# Patient Record
Sex: FEMALE | Race: WHITE | NOT HISPANIC OR LATINO | Employment: FULL TIME | URBAN - NONMETROPOLITAN AREA
[De-identification: names, ages, dates, MRNs, and addresses within clinical notes are randomized per-mention and may not be internally consistent; named-entity substitution may affect disease eponyms.]

---

## 2020-11-02 ENCOUNTER — OFFICE VISIT CONVERTED (OUTPATIENT)
Dept: FAMILY MEDICINE CLINIC | Age: 30
End: 2020-11-02
Attending: NURSE PRACTITIONER

## 2020-11-23 ENCOUNTER — OFFICE VISIT CONVERTED (OUTPATIENT)
Dept: FAMILY MEDICINE CLINIC | Age: 30
End: 2020-11-23
Attending: NURSE PRACTITIONER

## 2021-01-19 ENCOUNTER — OFFICE VISIT CONVERTED (OUTPATIENT)
Dept: FAMILY MEDICINE CLINIC | Age: 31
End: 2021-01-19
Attending: NURSE PRACTITIONER
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
## 2021-05-18 NOTE — PROGRESS NOTES
Martha Kothari  1990     Office/Outpatient Visit    Visit Date: Tue, Jan 19, 2021 02:47 pm    Provider: Hayde Nair N.P. (Assistant: Rosana Cooley MA)    Location: [facility]        Electronically signed by Hayde Nair N.P. on  01/24/2021 04:09:23 PM                             Subjective:        CC: Ms. Kothari is a 30 year old White female.  presents today due to refills         HPI:           Patient to be evaluated for dysthymic disorder.  Her anxiety disorder was originally diagnosed 6 months ago.  True panic attacks occur in addition to generalized anxiety.  reports that she ran out of the medication about 1 week ago and can tell .  She needs to get refills. She feels like the current dose is working ok           Complaint of panic disorder [episodic paroxysmal anxiety]..  Joy reports that her panic attacks have decreased.  She , however, has not used her medication  - she actually did not get it filled.  She thinks she is doing ok on the sertraline     ROS:     CONSTITUTIONAL:  Negative for chills, fatigue and fever.      CARDIOVASCULAR:  Negative for chest pain and pedal edema.      RESPIRATORY:  Negative for recent cough and dyspnea.      PSYCHIATRIC:  Positive for anxiety, depression, feelings of stress, mood swings and difficulty concentrating.   Negative for suicidal thoughts.          Past Medical History / Family History / Social History:         Past Medical History:             PAST MEDICAL HISTORY     UNREMARKABLE         Surgical History:     NONE         Family History:     Unremarkable         Social History:     Occupation: [employer] work     Marital Status: Single     Children: 1 child         Tobacco/Alcohol/Supplements:     Last Reviewed on 1/19/2021 02:51 PM by Rosana Cooley    Tobacco: Current Smoker: She currently smokes every day, 1 pack per day.          Alcohol: Frequency: Socially     Caffeine:  She admits to consuming caffeine via  coffee ( 1 serving per day ) and -Monster.          Substance Abuse History:     None         Mental Health History:         Generalized Anxiety Disorder ( diagnosed in years )         Communicable Diseases (eg STDs):     Reportable health conditions; NEGATIVE         Current Problems:     Dysthymic disorder    Panic disorder [episodic paroxysmal anxiety]        Immunizations:     None        Allergies:     Last Reviewed on 1/19/2021 02:50 PM by Rosana Cooley    No Known Allergies.        Current Medications:     Last Reviewed on 1/19/2021 02:50 PM by Rosana Cooley    No Known Medications.    sertraline 50 mg oral tablet [take   1 tablet (50 mg) by oral route once daily]        Objective:        Vitals:         Current: 1/19/2021 2:52:47 PM    Ht:  5 ft, 4 in;  Wt: 162.4 lbs;  BMI: 27.9T: 97.9 F (temporal);  BP: 120/76 mm Hg (right arm, sitting);  P: 70 bpm (right arm (BP Cuff), sitting)        Exams:     PHYSICAL EXAM:     GENERAL: vital signs recorded - well developed, well nourished;  no apparent distress;     RESPIRATORY: normal appearance and symmetric expansion of chest wall; normal respiratory rate and pattern with no distress; normal breath sounds with no rales, rhonchi, wheezes or rubs;     CARDIOVASCULAR: normal rate; rhythm is regular;  no edema;     MUSCULOSKELETAL: normal gait; normal range of motion of all major muscle groups; no limb or joint pain with range of motion;     NEUROLOGIC: mental status: alert and oriented x 3;     PSYCHIATRIC: appropriate affect and demeanor; normal speech pattern; normal thought and perception;         Assessment:         F34.1   Dysthymic disorder       F41.0   Panic disorder [episodic paroxysmal anxiety]           ORDERS:         Meds Prescribed:       [Refilled] sertraline 50 mg oral tablet [take   1 tablet (50 mg) by oral route once daily], #30 (thirty) tablets, Refills: 3 (three)                 Plan:         Dysthymic disorderResume medication as  previously prescribed.  She will follow up in 3 months.  If continues to be stable, will have her follow up every 6 months          Prescriptions:       [Refilled] sertraline 50 mg oral tablet [take   1 tablet (50 mg) by oral route once daily], #30 (thirty) tablets, Refills: 3 (three)         Panic disorder [episodic paroxysmal anxiety]She has not been using the hydroxyzine  and will fill if she needs before her next appt in 3 months            Charge Capture:         Primary Diagnosis:     F34.1  Dysthymic disorder           Orders:      [id number]  Office/outpatient visit; established patient, level 3  (In-House)              F41.0  Panic disorder [episodic paroxysmal anxiety]         ADDENDUMS:      ____________________________________    Addendum: 01/26/2021 01:02 PM - Hayde Nair   [id number]        Add   [id number]

## 2021-05-18 NOTE — PROGRESS NOTES
Martha Kothari  1990     Office/Outpatient Visit    Visit Date: Mon, Nov 23, 2020 02:36 pm    Provider: Hayde Nair N.P. (Assistant: Beatris Cuadra MA)    Location: [facility]        Electronically signed by Hayde Nair N.P. on  11/23/2020 05:35:03 PM                             Subjective:        CC: Ms. Kothari is a 30 year old White female.  This is a follow-up visit.  on meds, pt says she did not have hydroxyzine filled;         HPI:           Patient presents with dysthymic disorder.  This is a routine follow-up.  Current medications include Zoloft.  Current affective symptoms include anxious mood and feels like her moods are much more stable, seems to not be as overwhelmed with her emotions.  She does continue to have problems falling asleep, but does not think she needs any additional medication at this time.  She denies altered sleep habits.  Presently, Ms. Kothari denies suicidal ideation.      ROS:     CONSTITUTIONAL:  Negative for chills, fatigue and fever.      CARDIOVASCULAR:  Negative for chest pain and pedal edema.      RESPIRATORY:  Negative for recent cough and dyspnea.      GASTROINTESTINAL:  Negative for abdominal pain, constipation, diarrhea, heartburn, nausea and vomiting.      GENITOURINARY:  Negative for dysuria and change in urine stream.      PSYCHIATRIC:  Positive for anxiety, depression, feelings of stress ( (but improved) ) and sleep disturbance.   Negative for suicidal thoughts.          Past Medical History / Family History / Social History:         Past Medical History:             PAST MEDICAL HISTORY     UNREMARKABLE         Surgical History:     NONE         Family History:     Unremarkable         Social History:     Occupation: [employer] work     Marital Status: Single     Children: 1 child         Tobacco/Alcohol/Supplements:     Last Reviewed on 11/02/2020 02:31 PM by Anna Zurita    Tobacco: Current Smoker: She currently smokes every  day, 1 pack per day.          Alcohol: Frequency: Socially     Caffeine:  She admits to consuming caffeine via coffee ( 1 serving per day ) and -Monster.          Substance Abuse History:     None         Mental Health History:         Generalized Anxiety Disorder ( diagnosed in years )         Communicable Diseases (eg STDs):     Reportable health conditions; NEGATIVE         Current Problems:     Dysthymic disorder    Panic disorder [episodic paroxysmal anxiety]        Immunizations:     None        Allergies:     Last Reviewed on 11/02/2020 02:31 PM by Anna Zurita    No Known Allergies.        Current Medications:     Last Reviewed on 11/02/2020 02:31 PM by Anna Zurita    No Known Medications.    sertraline 50 mg oral tablet [take 1/2 tab x 3 days then  1 tablet (50 mg) by oral route once daily]    hydrOXYzine HCL 50 mg oral tablet [take 1/2 - 1 tablet (50 mg) by oral route BID PRN panic attacks]        Objective:        Vitals:         Current: 11/23/2020 2:43:12 PM    Ht:  5 ft, 4 in;  Wt: 158.8 lbs;  BMI: 27.3T: 97.9 F (temporal);  BP: 126/72 mm Hg (left arm, sitting);  P: 72 bpm (left arm (BP Cuff), sitting)        Exams:     PHYSICAL EXAM:     GENERAL: vital signs recorded - well developed, well nourished;  no apparent distress;     RESPIRATORY: normal appearance and symmetric expansion of chest wall; normal respiratory rate and pattern with no distress; normal breath sounds with no rales, rhonchi, wheezes or rubs;     CARDIOVASCULAR: normal rate; rhythm is regular;  no edema;     MUSCULOSKELETAL: normal gait; normal range of motion of all major muscle groups; no limb or joint pain with range of motion;     NEUROLOGIC: mental status: alert and oriented x 3;     PSYCHIATRIC: appropriate affect and demeanor; normal speech pattern; normal thought and perception;         Assessment:         F34.1   Dysthymic disorder           ORDERS:         Meds Prescribed:       [Refilled] sertraline 50 mg oral tablet  [take   1 tablet (50 mg) by oral route once daily], #30 (thirty) tablets, Refills: 2 (two)                 Plan:         Dysthymic disorderWill keep her hon medication for now at the current dose and will have her fllow up in 3 months   sooner if problems or worsening symtpoms          Prescriptions:       [Refilled] sertraline 50 mg oral tablet [take   1 tablet (50 mg) by oral route once daily], #30 (thirty) tablets, Refills: 2 (two)             Charge Capture:         Primary Diagnosis:     F34.1  Dysthymic disorder           Orders:      [id number]  Office/outpatient visit; established patient, level 3  (In-House)

## 2021-06-14 ENCOUNTER — OFFICE VISIT (OUTPATIENT)
Dept: FAMILY MEDICINE CLINIC | Age: 31
End: 2021-06-14

## 2021-06-14 ENCOUNTER — LAB (OUTPATIENT)
Dept: LAB | Facility: HOSPITAL | Age: 31
End: 2021-06-14

## 2021-06-14 VITALS
TEMPERATURE: 98.9 F | HEART RATE: 70 BPM | WEIGHT: 154.8 LBS | HEIGHT: 64 IN | SYSTOLIC BLOOD PRESSURE: 130 MMHG | BODY MASS INDEX: 26.43 KG/M2 | DIASTOLIC BLOOD PRESSURE: 75 MMHG

## 2021-06-14 DIAGNOSIS — N94.6 DYSMENORRHEA: ICD-10-CM

## 2021-06-14 DIAGNOSIS — R53.83 FATIGUE, UNSPECIFIED TYPE: ICD-10-CM

## 2021-06-14 DIAGNOSIS — F34.1 DYSTHYMIC DISORDER: Primary | ICD-10-CM

## 2021-06-14 LAB
ALBUMIN SERPL-MCNC: 4.3 G/DL (ref 3.5–5.2)
ALBUMIN/GLOB SERPL: 2.5 G/DL
ALP SERPL-CCNC: 54 U/L (ref 39–117)
ALT SERPL W P-5'-P-CCNC: 16 U/L (ref 1–33)
ANION GAP SERPL CALCULATED.3IONS-SCNC: 10.1 MMOL/L (ref 5–15)
AST SERPL-CCNC: 15 U/L (ref 1–32)
BACTERIA UR QL AUTO: ABNORMAL /HPF
BASOPHILS # BLD AUTO: 0.03 10*3/MM3 (ref 0–0.2)
BASOPHILS NFR BLD AUTO: 0.5 % (ref 0–1.5)
BILIRUB SERPL-MCNC: 0.2 MG/DL (ref 0–1.2)
BILIRUB UR QL STRIP: NEGATIVE
BUN SERPL-MCNC: 13 MG/DL (ref 6–20)
BUN/CREAT SERPL: 20.6 (ref 7–25)
CALCIUM SPEC-SCNC: 8.9 MG/DL (ref 8.6–10.5)
CHLORIDE SERPL-SCNC: 105 MMOL/L (ref 98–107)
CLARITY UR: ABNORMAL
CO2 SERPL-SCNC: 26.9 MMOL/L (ref 22–29)
COLOR UR: YELLOW
CREAT SERPL-MCNC: 0.63 MG/DL (ref 0.57–1)
DEPRECATED RDW RBC AUTO: 41.8 FL (ref 37–54)
EOSINOPHIL # BLD AUTO: 0.05 10*3/MM3 (ref 0–0.4)
EOSINOPHIL NFR BLD AUTO: 0.9 % (ref 0.3–6.2)
ERYTHROCYTE [DISTWIDTH] IN BLOOD BY AUTOMATED COUNT: 12.3 % (ref 12.3–15.4)
GFR SERPL CREATININE-BSD FRML MDRD: 110 ML/MIN/1.73
GLOBULIN UR ELPH-MCNC: 1.7 GM/DL
GLUCOSE SERPL-MCNC: 89 MG/DL (ref 65–99)
GLUCOSE UR STRIP-MCNC: NEGATIVE MG/DL
HCT VFR BLD AUTO: 42.6 % (ref 34–46.6)
HGB BLD-MCNC: 13.9 G/DL (ref 12–15.9)
HGB UR QL STRIP.AUTO: ABNORMAL
IMM GRANULOCYTES # BLD AUTO: 0 10*3/MM3 (ref 0–0.05)
IMM GRANULOCYTES NFR BLD AUTO: 0 % (ref 0–0.5)
IRON 24H UR-MRATE: 47 MCG/DL (ref 37–145)
IRON SATN MFR SERPL: 14 % (ref 20–50)
KETONES UR QL STRIP: NEGATIVE
LEUKOCYTE ESTERASE UR QL STRIP.AUTO: NEGATIVE
LYMPHOCYTES # BLD AUTO: 2.07 10*3/MM3 (ref 0.7–3.1)
LYMPHOCYTES NFR BLD AUTO: 37.6 % (ref 19.6–45.3)
MCH RBC QN AUTO: 30 PG (ref 26.6–33)
MCHC RBC AUTO-ENTMCNC: 32.6 G/DL (ref 31.5–35.7)
MCV RBC AUTO: 91.8 FL (ref 79–97)
MONOCYTES # BLD AUTO: 0.25 10*3/MM3 (ref 0.1–0.9)
MONOCYTES NFR BLD AUTO: 4.5 % (ref 5–12)
NEUTROPHILS NFR BLD AUTO: 3.1 10*3/MM3 (ref 1.7–7)
NEUTROPHILS NFR BLD AUTO: 56.5 % (ref 42.7–76)
NITRITE UR QL STRIP: NEGATIVE
OTHER OBSERVATIONS IN URINE MICRO: ABNORMAL /HPF
PH UR STRIP.AUTO: 8 [PH] (ref 5–8)
PLATELET # BLD AUTO: 244 10*3/MM3 (ref 140–450)
PMV BLD AUTO: 10.6 FL (ref 6–12)
POTASSIUM SERPL-SCNC: 4.1 MMOL/L (ref 3.5–5.2)
PROT SERPL-MCNC: 6 G/DL (ref 6–8.5)
PROT UR QL STRIP: NEGATIVE
RBC # BLD AUTO: 4.64 10*6/MM3 (ref 3.77–5.28)
RBC # UR: ABNORMAL /HPF
REF LAB TEST METHOD: ABNORMAL
SODIUM SERPL-SCNC: 142 MMOL/L (ref 136–145)
SP GR UR STRIP: 1.02 (ref 1–1.03)
SQUAMOUS #/AREA URNS HPF: ABNORMAL /HPF
T4 FREE SERPL-MCNC: 1.01 NG/DL (ref 0.93–1.7)
TIBC SERPL-MCNC: 325 MCG/DL (ref 298–536)
TRANSFERRIN SERPL-MCNC: 218 MG/DL (ref 200–360)
TSH SERPL DL<=0.05 MIU/L-ACNC: 1.11 UIU/ML (ref 0.27–4.2)
UROBILINOGEN UR QL STRIP: ABNORMAL
VIT B12 BLD-MCNC: 1108 PG/ML (ref 211–946)
WBC # BLD AUTO: 5.5 10*3/MM3 (ref 3.4–10.8)
WBC UR QL AUTO: ABNORMAL /HPF

## 2021-06-14 PROCEDURE — 84439 ASSAY OF FREE THYROXINE: CPT

## 2021-06-14 PROCEDURE — 82607 VITAMIN B-12: CPT

## 2021-06-14 PROCEDURE — 84466 ASSAY OF TRANSFERRIN: CPT

## 2021-06-14 PROCEDURE — 83540 ASSAY OF IRON: CPT

## 2021-06-14 PROCEDURE — 36415 COLL VENOUS BLD VENIPUNCTURE: CPT

## 2021-06-14 PROCEDURE — 84443 ASSAY THYROID STIM HORMONE: CPT

## 2021-06-14 PROCEDURE — 81001 URINALYSIS AUTO W/SCOPE: CPT

## 2021-06-14 PROCEDURE — 99214 OFFICE O/P EST MOD 30 MIN: CPT | Performed by: NURSE PRACTITIONER

## 2021-06-14 PROCEDURE — 85025 COMPLETE CBC W/AUTO DIFF WBC: CPT

## 2021-06-14 PROCEDURE — 80053 COMPREHEN METABOLIC PANEL: CPT

## 2021-06-14 NOTE — PROGRESS NOTES
"Chief Complaint  Follow-up    Subjective          Martha Kothari presents to [facility]     Re: Dysthymic disorder Ms. Kothari reports today that she is doing well on sertraline however she is having intermittent flareups which she calls loss of control.  She reports this happens about every 2 to 3 months it is not regular.  She was previously referred to mental health counseling at which time she thought the cost was too much so she declined to make an appointment.  She wants to continue on the sertraline.  Denies suicidal ideation.  She does feel like these episodes are triggered by \"trauma\".  Anything that is out of her normal day-to-day she considers trauma.    She is also concerned about fatigue.  She reports that she cannot ever get enough sleep.  She is requesting labs today.  LMP 3 weeks ago    Review of Systems   Constitutional: Positive for fatigue. Negative for appetite change.   Respiratory: Negative for cough and chest tightness.    Cardiovascular: Positive for palpitations (occasional with panic attack). Negative for chest pain.   Endocrine: Positive for polyuria.   Genitourinary: Positive for menstrual problem (very heavy periods  - goest through super in 1 hour at times ).   Allergic/Immunologic: Positive for environmental allergies.   Neurological: Positive for dizziness (when going from sitting to standing).   Psychiatric/Behavioral: Positive for agitation, decreased concentration and sleep disturbance (insomnia/ hypersomnia).        Health Maintenance Due   Topic Date Due   • ANNUAL PHYSICAL  Never done   • Pneumococcal Vaccine 0-64 (1 of 1 - PPSV23) Never done   • COVID-19 Vaccine (1) Never done   • HEPATITIS C SCREENING  Never done   • PAP SMEAR  Never done          Objective   Vital Signs:   /75 (BP Location: Left arm, Patient Position: Sitting)   Pulse 70   Temp 98.9 °F (37.2 °C) (Oral)   Ht 162.6 cm (64\")   Wt 70.2 kg (154 lb 12.8 oz)   BMI 26.57 " kg/m²     Physical Exam  Vitals reviewed.   Constitutional:       Appearance: Normal appearance. She is well-developed. She is not ill-appearing.   HENT:      Head: Normocephalic.      Right Ear: Tympanic membrane, ear canal and external ear normal.      Left Ear: Tympanic membrane, ear canal and external ear normal.      Mouth/Throat:      Lips: Pink.      Mouth: Mucous membranes are moist.      Pharynx: Oropharynx is clear. Uvula midline. No oropharyngeal exudate.   Eyes:      Extraocular Movements: Extraocular movements intact.      Conjunctiva/sclera: Conjunctivae normal.      Pupils: Pupils are equal, round, and reactive to light.   Neck:      Thyroid: No thyromegaly.   Cardiovascular:      Rate and Rhythm: Normal rate and regular rhythm.      Heart sounds: No murmur heard.   No friction rub. No gallop.    Pulmonary:      Effort: Pulmonary effort is normal.      Breath sounds: Normal breath sounds. No wheezing or rhonchi.   Abdominal:      General: Bowel sounds are normal. There is no distension.      Palpations: Abdomen is soft.      Tenderness: There is no abdominal tenderness.   Musculoskeletal:      Cervical back: Normal range of motion.   Lymphadenopathy:      Head:      Right side of head: No submandibular adenopathy.      Left side of head: No submandibular adenopathy.      Cervical: No cervical adenopathy.   Skin:     General: Skin is warm and dry.      Findings: No lesion (abnormal moles) or rash.   Neurological:      Mental Status: She is alert and oriented to person, place, and time.      Cranial Nerves: No cranial nerve deficit.      Gait: Gait is intact.   Psychiatric:         Mood and Affect: Affect normal. Mood is anxious.         Behavior: Behavior normal.         Thought Content: Thought content normal.         Judgment: Judgment normal.          Result Review :              Assessment and Plan    Diagnoses and all orders for this visit:    1. Dysthymic disorder (Primary)  Comments:  Will have  her set up appt with [facility]  - discuss with them payment options. consider medication management with Psych  Assessment & Plan:  Patient's depression is recurrent and is moderate without psychosis. Their depression is currently active and the condition is worsening. This will be reassessed in 3 months. F/U as described:patient will continue current medication therapy and patient referred to Mental Health Specialist.    Orders:  -     Cancel: Ambulatory Referral to Psychiatry  -     sertraline (ZOLOFT) 50 MG tablet; Take 1 tablet by mouth Daily for 30 doses.  Dispense: 30 tablet; Refill: 3  -     Ambulatory Referral to Psychiatry    2. Fatigue, unspecified type  Comments:  will get labs today   - may need sleep study in future  - but plan to get mental health needs addressed prior to moving forward   Orders:  -     CBC w AUTO Differential; Future  -     Comprehensive metabolic panel; Future  -     TSH+Free T4; Future  -     Vitamin B12; Future  -     Iron and TIBC; Future  -     Urinalysis With Microscopic - Urine, Clean Catch; Future    3. Dysmenorrhea  Comments:  will check iron, consider OC   Orders:  -     Iron and TIBC; Future  -     Urinalysis With Microscopic - Urine, Clean Catch; Future      Follow Up    No follow-ups on file.  Patient was given instructions and counseling regarding her condition or for health maintenance advice. Please see specific information pulled into the AVS if appropriate.

## 2021-06-14 NOTE — ASSESSMENT & PLAN NOTE
Patient's depression is recurrent and is moderate without psychosis. Their depression is currently active and the condition is worsening. This will be reassessed in 3 months. F/U as described:patient will continue current medication therapy and patient referred to Mental Health Specialist.

## 2021-07-02 VITALS
HEIGHT: 64 IN | TEMPERATURE: 97.9 F | DIASTOLIC BLOOD PRESSURE: 76 MMHG | WEIGHT: 162.4 LBS | SYSTOLIC BLOOD PRESSURE: 120 MMHG | BODY MASS INDEX: 27.72 KG/M2 | HEART RATE: 70 BPM

## 2021-07-02 VITALS
WEIGHT: 158.8 LBS | SYSTOLIC BLOOD PRESSURE: 126 MMHG | HEART RATE: 72 BPM | BODY MASS INDEX: 27.11 KG/M2 | TEMPERATURE: 97.9 F | HEIGHT: 64 IN | DIASTOLIC BLOOD PRESSURE: 72 MMHG

## 2021-07-02 VITALS
BODY MASS INDEX: 27.86 KG/M2 | WEIGHT: 163.2 LBS | HEART RATE: 70 BPM | TEMPERATURE: 97.7 F | SYSTOLIC BLOOD PRESSURE: 122 MMHG | HEIGHT: 64 IN | DIASTOLIC BLOOD PRESSURE: 84 MMHG

## 2021-08-17 NOTE — PROGRESS NOTES
"Chief Complaint  Arm Pain (right, painful and swollen, started Friday)    Subjective          Martha Kothari presents to [facility]  Right upper arm pain started Friday.  Her arm was warm to touch on Saturday and Sunday, and was \"reddish-blue\" in color, but it's not warm or discolored any more.  It was not tender to touch, but has been painful for her to move her arm.  She went to [facility] on August 16, and a venous Doppler of her right arm did not reveal any thrombosis.  Her x-ray of her right shoulder revealed mild soft tissue swelling around the shoulder without acute osseous abnormality.  She does not remember any injury to her right shoulder.  Has been very hard for her to put on her bra and brush her teeth due to the pain.  She has not started her naproxen yet, but she has been taking ibuprofen.  She has also been icing her shoulder.    Arm Pain   The incident occurred 5 to 7 days ago. There was no injury mechanism. The pain is present in the right shoulder, upper right arm and right elbow. The quality of the pain is described as aching and stabbing. The pain does not radiate. The pain is at a severity of 8/10. The pain is severe. The pain has been constant since the incident. Associated symptoms include muscle weakness. Pertinent negatives include no numbness or tingling. The symptoms are aggravated by lifting and movement. She has tried ice, NSAIDs and elevation for the symptoms. The treatment provided no relief.       Objective   Vital Signs:   /80 (BP Location: Left arm, Patient Position: Sitting)   Pulse 72   Ht 162.6 cm (64\")   Wt 73.2 kg (161 lb 6.4 oz)   BMI 27.70 kg/m²     Physical Exam  Constitutional:       General: She is not in acute distress.     Appearance: Normal appearance. She is normal weight.   HENT:      Head: Normocephalic.   Eyes:      Pupils: Pupils are equal, round, and reactive to light.      Visual Fields: Right eye visual fields normal and " left eye visual fields normal.   Neck:      Trachea: Trachea normal.   Cardiovascular:      Rate and Rhythm: Normal rate and regular rhythm.      Heart sounds: Normal heart sounds.   Pulmonary:      Effort: Pulmonary effort is normal.      Breath sounds: Normal breath sounds and air entry.   Musculoskeletal:         General: Swelling (Right shoulder and upper arm) present.      Right lower leg: No edema.      Left lower leg: No edema.   Skin:     General: Skin is warm and dry.   Neurological:      Mental Status: She is alert and oriented to person, place, and time.   Psychiatric:         Mood and Affect: Mood normal. Affect is tearful.         Behavior: Behavior normal.         Thought Content: Thought content normal.        Result Review :   The following data was reviewed by: ADAM Jacques on 08/18/2021:  RADIOLOGY - SCAN - DOPPLER/FLAGET/[id number] (08/16/2021)  RADIOLOGY - SCAN - RSHOULDER/FLAGET/[id number] (08/16/2021)               Assessment and Plan    Diagnoses and all orders for this visit:    1. Pain and swelling of right shoulder (Primary)  -     Ambulatory Referral to Physical Therapy Evaluate and treat  -     MRI Shoulder Right Without Contrast; Future    2. Pain of right upper arm  -     Ambulatory Referral to Physical Therapy Evaluate and treat  -     MRI Shoulder Right Without Contrast; Future    Her right shoulder and right upper arm are considerably more swollen than her left shoulder and upper arm.  There was no pain to palpation and it was not warm to touch.  Will try to obtain an MRI of her shoulder.  Doppler of her right arm did not reveal any thrombosis.  We have discussed the use of naproxen and NSAIDs.  I have also recommended that she continue to ice it.  Will refer to physical therapy as well.    Follow Up   Return if symptoms worsen or fail to improve, for Pending test results.  Patient was given instructions and counseling regarding her condition or for health maintenance advice.  Please see specific information pulled into the AVS if appropriate.

## 2021-08-18 ENCOUNTER — OFFICE VISIT (OUTPATIENT)
Dept: FAMILY MEDICINE CLINIC | Age: 31
End: 2021-08-18

## 2021-08-18 VITALS
BODY MASS INDEX: 27.55 KG/M2 | SYSTOLIC BLOOD PRESSURE: 124 MMHG | WEIGHT: 161.4 LBS | HEART RATE: 72 BPM | HEIGHT: 64 IN | DIASTOLIC BLOOD PRESSURE: 80 MMHG

## 2021-08-18 DIAGNOSIS — M25.511 PAIN AND SWELLING OF RIGHT SHOULDER: Primary | ICD-10-CM

## 2021-08-18 DIAGNOSIS — M25.411 PAIN AND SWELLING OF RIGHT SHOULDER: Primary | ICD-10-CM

## 2021-08-18 DIAGNOSIS — M79.621 PAIN OF RIGHT UPPER ARM: ICD-10-CM

## 2021-08-18 PROCEDURE — 99213 OFFICE O/P EST LOW 20 MIN: CPT | Performed by: NURSE PRACTITIONER

## 2021-08-18 RX ORDER — NAPROXEN 500 MG/1
500 TABLET ORAL AS NEEDED
COMMUNITY
Start: 2021-08-17 | End: 2021-10-12

## 2021-08-23 NOTE — PROGRESS NOTES
"Chief Complaint  Hospital Follow Up Visit ([facility], 8/19-21/21, rhabdo)    Subjective          Martha Kothari presents to [facility]  She was admitted to [facility] on 08/19 for rhabdomyolysis.  She was discharged on 08/2021.  She was given \"a lot\" of IV fluids, and was discharged.  No history of statin use.  She was lifting weights, but nothing extreme.  She is taking Zoloft.  She is wanting to come off Zoloft, as she feels that she doesn't need it anymore.  She has not taken the medication in over a week and a half.  She has not had any symptoms in regards to stopping her Zoloft.  She feels that her anxiety and depression have significantly improved.  Denies SI/HI.  Her shoulder is , but the swelling in her arm has improved.  She reports that her urine is not dark.  She still has some tenderness, but most of the pain has resolved.  Most of the swelling is gone.       Objective   Vital Signs:   /86 (BP Location: Left arm, Patient Position: Sitting)   Pulse 57   Ht 162.6 cm (64\")   Wt 82.2 kg (181 lb 3.2 oz)   BMI 31.10 kg/m²     Physical Exam  Constitutional:       General: She is not in acute distress.     Appearance: Normal appearance. She is normal weight.   HENT:      Head: Normocephalic.   Eyes:      Pupils: Pupils are equal, round, and reactive to light.      Visual Fields: Right eye visual fields normal and left eye visual fields normal.   Neck:      Trachea: Trachea normal.   Cardiovascular:      Rate and Rhythm: Normal rate and regular rhythm.      Heart sounds: Normal heart sounds.   Pulmonary:      Effort: Pulmonary effort is normal.      Breath sounds: Normal breath sounds and air entry.   Musculoskeletal:         General: Swelling (Minor Right shoulder) and tenderness (slight in right shoulder) present.      Right lower leg: No edema.      Left lower leg: No edema.   Skin:     General: Skin is warm and dry.   Neurological:      " Mental Status: She is alert and oriented to person, place, and time.   Psychiatric:         Mood and Affect: Mood and affect normal.         Behavior: Behavior normal.         Thought Content: Thought content normal.        Result Review :   The following data was reviewed by: ADAM Jacques on 08/24/2021:  CBC AND DIFFERENTIAL (08/21/2021 03:08)  CK (08/21/2021 03:08)  COMPREHENSIVE METABOLIC PANEL (08/19/2021 00:17)  Venous doppler from 08/19/2021             Assessment and Plan    Diagnoses and all orders for this visit:    1. Non-traumatic rhabdomyolysis (Primary)  -     Ambulatory Referral to Physical Therapy Evaluate and treat  -     Comprehensive Metabolic Panel; Future  -     CK; Future    2. Acute pain of right shoulder    She was recently hospitalized for rhabdomyolysis.  Her swelling and pain have greatly improved.  Will refer her to PT.  Zoloft may have been the source of her rhabdomyolysis.  Will still pursue MRI of shoulder due to abnormal venous doppler at [facility].    Follow Up   Return for Pending test results.  Patient was given instructions and counseling regarding her condition or for health maintenance advice. Please see specific information pulled into the AVS if appropriate.

## 2021-08-24 ENCOUNTER — OFFICE VISIT (OUTPATIENT)
Dept: FAMILY MEDICINE CLINIC | Age: 31
End: 2021-08-24

## 2021-08-24 ENCOUNTER — LAB (OUTPATIENT)
Dept: LAB | Facility: HOSPITAL | Age: 31
End: 2021-08-24

## 2021-08-24 VITALS
DIASTOLIC BLOOD PRESSURE: 86 MMHG | HEIGHT: 64 IN | HEART RATE: 57 BPM | WEIGHT: 181.2 LBS | SYSTOLIC BLOOD PRESSURE: 139 MMHG | BODY MASS INDEX: 30.93 KG/M2

## 2021-08-24 DIAGNOSIS — M62.82 NON-TRAUMATIC RHABDOMYOLYSIS: ICD-10-CM

## 2021-08-24 DIAGNOSIS — M25.511 ACUTE PAIN OF RIGHT SHOULDER: ICD-10-CM

## 2021-08-24 DIAGNOSIS — M62.82 NON-TRAUMATIC RHABDOMYOLYSIS: Primary | ICD-10-CM

## 2021-08-24 LAB
ALBUMIN SERPL-MCNC: 3.7 G/DL (ref 3.5–5.2)
ALBUMIN/GLOB SERPL: 1.9 G/DL
ALP SERPL-CCNC: 51 U/L (ref 39–117)
ALT SERPL W P-5'-P-CCNC: 41 U/L (ref 1–33)
ANION GAP SERPL CALCULATED.3IONS-SCNC: 10.2 MMOL/L (ref 5–15)
AST SERPL-CCNC: 14 U/L (ref 1–32)
BILIRUB SERPL-MCNC: 0.2 MG/DL (ref 0–1.2)
BUN SERPL-MCNC: 13 MG/DL (ref 6–20)
BUN/CREAT SERPL: 10.3 (ref 7–25)
CALCIUM SPEC-SCNC: 8.5 MG/DL (ref 8.6–10.5)
CHLORIDE SERPL-SCNC: 109 MMOL/L (ref 98–107)
CK SERPL-CCNC: 254 U/L (ref 20–180)
CO2 SERPL-SCNC: 26.8 MMOL/L (ref 22–29)
CREAT SERPL-MCNC: 1.26 MG/DL (ref 0.57–1)
GFR SERPL CREATININE-BSD FRML MDRD: 50 ML/MIN/1.73
GLOBULIN UR ELPH-MCNC: 1.9 GM/DL
GLUCOSE SERPL-MCNC: 84 MG/DL (ref 65–99)
POTASSIUM SERPL-SCNC: 4.4 MMOL/L (ref 3.5–5.2)
PROT SERPL-MCNC: 5.6 G/DL (ref 6–8.5)
SODIUM SERPL-SCNC: 146 MMOL/L (ref 136–145)

## 2021-08-24 PROCEDURE — 99214 OFFICE O/P EST MOD 30 MIN: CPT | Performed by: NURSE PRACTITIONER

## 2021-08-24 PROCEDURE — 80053 COMPREHEN METABOLIC PANEL: CPT

## 2021-08-24 PROCEDURE — 36415 COLL VENOUS BLD VENIPUNCTURE: CPT

## 2021-08-24 PROCEDURE — 82550 ASSAY OF CK (CPK): CPT

## 2021-08-24 NOTE — PATIENT INSTRUCTIONS
Rhabdomyolysis  Rhabdomyolysis is a condition that happens when muscle cells break down and release substances into the blood that can damage the kidneys. This condition happens because of damage to the muscles that move bones (skeletal muscles). When the skeletal muscles are damaged, substances inside the muscle cells go into the blood. One of these substances is a protein called myoglobin.  Large amounts of myoglobin can cause kidney damage or kidney failure. Other substances that are released by muscle cells may upset the balance of the minerals (electrolytes) in the blood. This imbalance causes the blood to have too much acid (acidosis).  What are the causes?  This condition is caused by muscle damage. There are common causes and possible causes.  Common causes of the condition  Common causes of muscle damage include:  · Using the muscles too much.  · Getting an injury that crushes or squeezes a muscle too tightly.  · Using drugs, mainly cocaine.  · Drinking too much alcohol.  Possible causes of the condition  Other possible causes of this condition include:  · Some prescription medicines, such as:  ? Medicines that lower cholesterol (statins).  ? Amphetamines, which are used to treat attention deficit hyperactivity disorder (ADHD) or to help with weight loss.  ? Certain pain medicines (opiates).  · Infections.  · Muscle diseases that are passed down from parent to child (inherited).  · High fever.  · Heatstroke.  · Dehydration.  · Seizures.  · Surgery.  What increases the risk?  The following factors may make you more likely to develop this condition:  · A family history of muscle disease.  · Taking part in extreme sports, such as running in marathons.  · Diabetes.  · Being an older adult.  · Heavy drug or alcohol use.  What are the signs or symptoms?  A person can have the symptoms of rhabdomyolysis or complications that come from the symptoms.  Symptoms of the disease  Symptoms of this condition vary. Some  people have very few symptoms and other people have many symptoms.   Common symptoms include:  · Muscle pain and swelling.  · Weak muscles.  · Dark urine.  · Feeling weak and tired.  Other symptoms include:  · Nausea and vomiting.  · A fever.  · Pain in the abdomen.  · Pain in the joints.  Complications from the disease  Complications from this condition include:  · A heart rhythm that is not normal (arrhythmia).  · Seizures.  · Not urinating enough because of kidney failure.  · Very low blood pressure (hypotension) and shock. Signs of shock include dizziness, blurry vision, and clammy skin.  · Bleeding that is hard to stop or control.  · Compartment syndrome. This is when there is too much pressure in the space surrounding muscles.  How is this diagnosed?  This condition is diagnosed based on:  · Your symptoms and medical history.  · A physical exam.  · Tests, including:  ? Blood tests.  ? Urine tests to check for myoglobin.  You may also have other tests to check for causes of muscle damage and to check for complications.  How is this treated?  This condition may be treated with:  · Fluids and medicines given through an IV that is inserted into one of your veins.  · Medicines to lower acidosis or to restore the balance of electrolytes in your blood.  · Hemodialysis. This treatment uses an artificial kidney machine to filter your blood while you recover. You may have this if other treatments are not helping.  Follow these instructions at home:  Activity  · Rest at home as told by your health care provider.  · Return to your normal activities as told by your health care provider. Ask your health care provider what activities are safe for you.  · Do not do activities that take a lot of effort. Ask your health care provider what level of exercise is safe for you.  Alcohol use  · Do not drink alcohol if:  ? Your health care provider tells you not to drink.  ? You are pregnant, may be pregnant, or are planning to become  pregnant.  · If you drink alcohol:  ? Limit how much you use to:  § 0-1 drink a day for women.  § 0-2 drinks a day for men.  ? Be aware of how much alcohol is in your drink. In the [place] one drink equals one 12 oz bottle of beer (355 mL), one 5 oz glass of wine (148 mL), or one 1½ oz glass of hard liquor (44 mL).  General instructions    · Take over-the-counter and prescription medicines only as told by your health care provider.  · Drink enough fluid to keep your urine pale yellow.  · Do not use drugs.  · If you are having problems with drug or alcohol use, ask your health care provider for help.  · Keep all follow-up visits as told by your health care provider. This is important.  Contact a health care provider if:  · You start having symptoms of this condition after treatment.  Get help right away if you:  · Have a seizure.  · Bleed easily or cannot control bleeding.  · Cannot urinate.  · Have chest pain.  · Have trouble breathing.  These symptoms may represent a serious problem that is an emergency. Do not wait to see if the symptoms will go away. Get medical help right away. Call your local emergency services (911 in the [place] Do not drive yourself to the hospital.   Summary  · Rhabdomyolysis is a condition that happens when muscle cells break down and release substances into the blood that can damage the kidneys.  · This condition happens because of damage to the muscles that move bones (skeletal muscle). It can also be caused by too much alcohol and drug use.  · Treatment may include fluids, medicines, and helping the kidneys filter blood (hemodialysis).  · Contact your health care provider if you start having symptoms of this condition after treatment.  · Get help right away if you have a seizure, chest pain, or trouble breathing, or if you bleed easily, cannot control bleeding, or cannot urinate.  This information is not intended to replace advice given to you by your health care provider. Make sure you  discuss any questions you have with your health care provider.  Document Revised: 09/24/2020 Document Reviewed: 09/16/2020  Elsevier Patient Education © 2021 [employer]

## 2021-08-25 DIAGNOSIS — N28.9 FUNCTION KIDNEY DECREASED: Primary | ICD-10-CM

## 2021-08-27 ENCOUNTER — TELEPHONE (OUTPATIENT)
Dept: FAMILY MEDICINE CLINIC | Age: 31
End: 2021-08-27
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
## 2021-08-27 NOTE — TELEPHONE ENCOUNTER
Caller: myra    Relationship: Brother/Sister    Best call back number: [phone]    What is the best time to reach you: ANY    Who are you requesting to speak with (clinical staff, provider,  specific staff member): BRYAN    Do you know the name of the person who called: BRYAN    What was the call regarding: CT SCAN AND WHERE TO GO FROM HERE AS FAR AS MEDICAL CARE    Do you require a callback: YES

## 2021-08-31 ENCOUNTER — LAB (OUTPATIENT)
Dept: LAB | Facility: HOSPITAL | Age: 31
End: 2021-08-31

## 2021-08-31 ENCOUNTER — OFFICE VISIT (OUTPATIENT)
Dept: FAMILY MEDICINE CLINIC | Age: 31
End: 2021-08-31

## 2021-08-31 VITALS
HEIGHT: 64 IN | DIASTOLIC BLOOD PRESSURE: 88 MMHG | WEIGHT: 162.4 LBS | TEMPERATURE: 100 F | BODY MASS INDEX: 27.72 KG/M2 | HEART RATE: 74 BPM | SYSTOLIC BLOOD PRESSURE: 126 MMHG

## 2021-08-31 DIAGNOSIS — M62.82 NON-TRAUMATIC RHABDOMYOLYSIS: ICD-10-CM

## 2021-08-31 DIAGNOSIS — F15.10 METHAMPHETAMINE USE (HCC): ICD-10-CM

## 2021-08-31 DIAGNOSIS — R91.8 ABNORMAL CT SCAN OF LUNG: ICD-10-CM

## 2021-08-31 DIAGNOSIS — N28.9 FUNCTION KIDNEY DECREASED: ICD-10-CM

## 2021-08-31 DIAGNOSIS — R56.9 SEIZURE (HCC): Primary | ICD-10-CM

## 2021-08-31 DIAGNOSIS — R56.9 SEIZURE (HCC): ICD-10-CM

## 2021-08-31 LAB
ALBUMIN SERPL-MCNC: 4.5 G/DL (ref 3.5–5.2)
ALBUMIN/GLOB SERPL: 1.8 G/DL
ALP SERPL-CCNC: 64 U/L (ref 39–117)
ALT SERPL W P-5'-P-CCNC: 34 U/L (ref 1–33)
ANION GAP SERPL CALCULATED.3IONS-SCNC: 12.1 MMOL/L (ref 5–15)
AST SERPL-CCNC: 28 U/L (ref 1–32)
BILIRUB SERPL-MCNC: 0.2 MG/DL (ref 0–1.2)
BUN SERPL-MCNC: 12 MG/DL (ref 6–20)
BUN/CREAT SERPL: 17.1 (ref 7–25)
CALCIUM SPEC-SCNC: 9.4 MG/DL (ref 8.6–10.5)
CHLORIDE SERPL-SCNC: 105 MMOL/L (ref 98–107)
CK SERPL-CCNC: 1064 U/L (ref 20–180)
CO2 SERPL-SCNC: 23.9 MMOL/L (ref 22–29)
CREAT SERPL-MCNC: 0.7 MG/DL (ref 0.57–1)
DEPRECATED RDW RBC AUTO: 43.3 FL (ref 37–54)
EOSINOPHIL # BLD MANUAL: 0.13 10*3/MM3 (ref 0–0.4)
EOSINOPHIL NFR BLD MANUAL: 2 % (ref 0.3–6.2)
ERYTHROCYTE [DISTWIDTH] IN BLOOD BY AUTOMATED COUNT: 12.5 % (ref 12.3–15.4)
GFR SERPL CREATININE-BSD FRML MDRD: 98 ML/MIN/1.73
GLOBULIN UR ELPH-MCNC: 2.5 GM/DL
GLUCOSE SERPL-MCNC: 86 MG/DL (ref 65–99)
HCT VFR BLD AUTO: 44.6 % (ref 34–46.6)
HGB BLD-MCNC: 14 G/DL (ref 12–15.9)
LYMPHOCYTES # BLD MANUAL: 1.77 10*3/MM3 (ref 0.7–3.1)
LYMPHOCYTES NFR BLD MANUAL: 28.3 % (ref 19.6–45.3)
LYMPHOCYTES NFR BLD MANUAL: 7.1 % (ref 5–12)
MCH RBC QN AUTO: 29.6 PG (ref 26.6–33)
MCHC RBC AUTO-ENTMCNC: 31.4 G/DL (ref 31.5–35.7)
MCV RBC AUTO: 94.3 FL (ref 79–97)
MONOCYTES # BLD AUTO: 0.44 10*3/MM3 (ref 0.1–0.9)
NEUTROPHILS # BLD AUTO: 3.91 10*3/MM3 (ref 1.7–7)
NEUTROPHILS NFR BLD MANUAL: 62.6 % (ref 42.7–76)
PLAT MORPH BLD: NORMAL
PLATELET # BLD AUTO: 304 10*3/MM3 (ref 140–450)
PMV BLD AUTO: 12 FL (ref 6–12)
POTASSIUM SERPL-SCNC: 3.9 MMOL/L (ref 3.5–5.2)
PROT SERPL-MCNC: 7 G/DL (ref 6–8.5)
RBC # BLD AUTO: 4.73 10*6/MM3 (ref 3.77–5.28)
RBC MORPH BLD: NORMAL
SODIUM SERPL-SCNC: 141 MMOL/L (ref 136–145)
WBC # BLD AUTO: 6.25 10*3/MM3 (ref 3.4–10.8)
WBC MORPH BLD: NORMAL

## 2021-08-31 PROCEDURE — 80053 COMPREHEN METABOLIC PANEL: CPT

## 2021-08-31 PROCEDURE — 36415 COLL VENOUS BLD VENIPUNCTURE: CPT

## 2021-08-31 PROCEDURE — 99214 OFFICE O/P EST MOD 30 MIN: CPT | Performed by: NURSE PRACTITIONER

## 2021-08-31 PROCEDURE — 85007 BL SMEAR W/DIFF WBC COUNT: CPT

## 2021-08-31 PROCEDURE — 85027 COMPLETE CBC AUTOMATED: CPT

## 2021-08-31 PROCEDURE — 82550 ASSAY OF CK (CPK): CPT

## 2021-08-31 PROCEDURE — 80307 DRUG TEST PRSMV CHEM ANLYZR: CPT
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
## 2021-09-02 ENCOUNTER — TELEPHONE (OUTPATIENT)
Dept: FAMILY MEDICINE CLINIC | Age: 31
End: 2021-09-02

## 2021-09-02 NOTE — TELEPHONE ENCOUNTER
Caller: Matrha Kothari    Relationship: Self    Best call back number: [phone]    What was the call regarding: PATIENT STATED THAT [facility] HAS AN MRI SCHEDULED FOR 9/9 FOR HER SHOULDER. SHE WAS CURIOUS IF THEY WOULD BE ABLE TO LOOK AT HER HEAD THEN TOO AS SHE HAS BEEN HAVING ISSUES WITH HER SEIZURES. PLEASE ADVISE IF THIS IS A POSSIBILITY.     Do you require a callback: YES

## 2021-09-03 LAB
AMPHETAMINES SERPL QL SCN: NEGATIVE NG/ML
BARBITURATES SERPL QL SCN: NEGATIVE UG/ML
BENZODIAZ SERPL QL SCN: NEGATIVE NG/ML
CANNABINOIDS SERPL QL SCN: NEGATIVE NG/ML
COCAINE+BZE SERPL QL SCN: NEGATIVE NG/ML
METHADONE SERPL QL SCN: NEGATIVE NG/ML
OPIATES SERPL QL SCN: NEGATIVE NG/ML
OXYCODONE+OXYMORPHONE SERPLBLD QL SCN: NEGATIVE NG/ML
PCP SERPL QL SCN: NEGATIVE NG/ML
PROPOXYPH SERPL QL SCN: NEGATIVE NG/ML

## 2021-09-08 ENCOUNTER — TELEPHONE (OUTPATIENT)
Dept: FAMILY MEDICINE CLINIC | Age: 31
End: 2021-09-08
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
## 2021-09-09 ENCOUNTER — APPOINTMENT (OUTPATIENT)
Dept: MRI IMAGING | Facility: HOSPITAL | Age: 31
End: 2021-09-09

## 2021-09-09 NOTE — TELEPHONE ENCOUNTER
PT CALLED CHECKING TO SEE IF SHE NEEDED A LAB ORDER IN THE SYSTEM.  PLEASE ADVISE, THANK YOU.    UNABLE TO WARM TRANSFER.

## 2021-09-15 ENCOUNTER — LAB (OUTPATIENT)
Dept: LAB | Facility: HOSPITAL | Age: 31
End: 2021-09-15

## 2021-09-15 DIAGNOSIS — M62.82 NON-TRAUMATIC RHABDOMYOLYSIS: ICD-10-CM

## 2021-09-15 LAB — CK SERPL-CCNC: 60 U/L (ref 20–180)

## 2021-09-15 PROCEDURE — 82550 ASSAY OF CK (CPK): CPT

## 2021-09-15 PROCEDURE — 36415 COLL VENOUS BLD VENIPUNCTURE: CPT

## 2021-10-01 ENCOUNTER — TELEPHONE (OUTPATIENT)
Dept: FAMILY MEDICINE CLINIC | Age: 31
End: 2021-10-01

## 2021-10-01 NOTE — TELEPHONE ENCOUNTER
Caller: Martha Kothari    Relationship to patient: Self    Best call back number: [phone]    Patient is needing: PATIENT CALLED IN AND SAID HER SHORT TERM DISABILITY NEEDS FURTHER DOCUMENTATION FROM THIS OFFICE FOR HER CLAIM #[id number]. SHE SAID IT NEEDS TO GO TO THE [place], . [address] [address] [place] [id number]. PHONE # [phone] AND FAX # [phone].   PATIENT WOULD ALSO LIKE A CALL BACK BECAUSE SHE SAID CORRESPONDENCE COMING FROM [place] STATED SHE HAS DIABETES AND SHE WAS UNAWARE, PLEASE CALL PATIENT AND ADVISE.

## 2021-10-11 ENCOUNTER — APPOINTMENT (OUTPATIENT)
Dept: MRI IMAGING | Facility: HOSPITAL | Age: 31
End: 2021-10-11

## 2021-10-12 ENCOUNTER — OFFICE VISIT (OUTPATIENT)
Dept: NEUROLOGY | Facility: CLINIC | Age: 31
End: 2021-10-12

## 2021-10-12 VITALS
DIASTOLIC BLOOD PRESSURE: 80 MMHG | WEIGHT: 165.4 LBS | HEART RATE: 75 BPM | BODY MASS INDEX: 28.24 KG/M2 | SYSTOLIC BLOOD PRESSURE: 131 MMHG | HEIGHT: 64 IN

## 2021-10-12 DIAGNOSIS — R56.9 SEIZURE-LIKE ACTIVITY (HCC): Primary | ICD-10-CM

## 2021-10-12 DIAGNOSIS — M62.82 NON-TRAUMATIC RHABDOMYOLYSIS: ICD-10-CM

## 2021-10-12 PROCEDURE — 99215 OFFICE O/P EST HI 40 MIN: CPT | Performed by: NURSE PRACTITIONER

## 2021-10-12 NOTE — PATIENT INSTRUCTIONS
Seizure, Adult  A seizure is a sudden burst of abnormal electrical activity in the brain. Seizures usually last from 30 seconds to 2 minutes. The abnormal activity temporarily interrupts normal brain function.  Many types of seizures can affect adults. A seizure can cause many different symptoms depending on where in the brain it starts.  What are the causes?  Common causes of this condition include:  · Fever or infection.  · Brain injury, head trauma, bleeding in the brain, or tumor.  · Low blood sugar.  · Metabolic disorders or other conditions that are passed from parent to child (are inherited).  · Reaction to a substance, such as a drug or a medicine, or suddenly stopping the use of a substance (withdrawal).  · Stroke.  · Developmental disorders such as autism or cerebral palsy.  In some cases, the cause of this condition may not be known. Some people who have a seizure never have another one. Seizures usually do not cause brain damage or permanent problems unless they are prolonged. A person who has repeated seizures over time without a clear cause has a condition called epilepsy.  What increases the risk?  You are more likely to develop this condition if you have:  · A family history of epilepsy.  · Had a tonic-clonic seizure in the past. This is a type of seizure that involves whole-body contraction of muscles and a loss of consciousness.  · A history of head trauma, lack of oxygen at birth, or strokes.  What are the signs or symptoms?  There are many different types of seizures. The symptoms vary depending on the type of seizure you have. Symptoms occur during the seizure and may also occur before a seizure (aura) and after a seizure (postictal).  Symptoms during a seizure  · Uncontrollable shaking (convulsions).  · Stiffening of the body.  · Loss of consciousness.  · Head nodding.  · Staring.  · Not responding to sound or touch.  · Loss of bladder or bowel control.  Symptoms before a seizure  · Fear or  anxiety.  · Nausea.  · Feeling like the room is spinning (vertigo).  · A feeling of having seen or heard something before (déjà vu).  · Odd tastes or smells.  · Changes in vision, such as seeing flashing lights or spots.  Symptoms after a seizure  · Confusion.  · Sleepiness.  · Headache.  · Weakness on one side of the body.  How is this diagnosed?  This condition may be diagnosed based on:  · A description of your symptoms. Video of your seizures can be helpful.  · Your medical history.  · A physical exam.  You may also have tests, including:  · Blood tests.  · CT scan.  · MRI.  · Electroencephalogram (EEG). This test measures electrical activity in the brain. An EEG can predict whether seizures will return (recur).  · A spinal tap (also called a lumbar puncture). This is the removal and testing of fluid that surrounds the brain and spinal cord.  How is this treated?  Most seizures will stop on their own in under 5 minutes, and no treatment is needed. Seizures that last longer than 5 minutes will usually need treatment. Treatment can include:  · Medicines given through an IV.  · Avoiding known triggers, such as medicines that you take for another condition.  · Medicines to treat epilepsy (antiepileptics), if epilepsy caused your seizures.  · Surgery to stop seizures, if you have epilepsy that does not respond to medicines.  Follow these instructions at home:  Medicines  · Take over-the-counter and prescription medicines only as told by your health care provider.  · Avoid any substances that may prevent your medicine from working properly, such as alcohol.  Activity  · Do not drive, swim, or do any other activities that would be dangerous if you had another seizure. Wait until your health care provider says it is safe to do them.  · If you live in the [place] check with your local DMV (department of motor vehicles) to find out about local driving laws. Each state has specific rules about when you can legally return to  driving.  · Get enough rest. Lack of sleep can make seizures more likely to occur.  Educating others  Teach friends and family what to do if you have a seizure. They should:  · Lay you on the ground to prevent a fall.  · Cushion your head and body.  · Loosen any tight clothing around your neck.  · Turn you on your side. If vomiting occurs, this helps keep your airway clear.  · Not hold you down. Holding you down will not stop the seizure.  · Not put anything into your mouth.  · Know whether or not you need emergency care. For example, they should get help right away if you have a seizure that lasts longer than 5 minutes or have several seizures in a row.  · Stay with you until you recover.    General instructions  · Contact your health care provider each time you have a seizure.  · Avoid anything that has ever triggered a seizure for you.  · Keep a seizure diary. Record what you remember about each seizure, especially anything that might have triggered the seizure.  · Keep all follow-up visits as told by your health care provider. This is important.  Contact a health care provider if:  · You have another seizure.  · You have seizures more often.  · Your seizure symptoms change.  · You continue to have seizures with treatment.  · You have symptoms of an infection or illness. This might increase your risk of having a seizure.  Get help right away if:  · You have a seizure that:  ? Lasts longer than 5 minutes.  ? Is different than previous seizures.  ? Leaves you unable to speak or use a part of your body.  ? Makes it harder to breathe.  · You have:  ? A seizure after a head injury.  ? Multiple seizures in a row.  ? Confusion or a severe headache right after a seizure.  · You do not wake up immediately after a seizure.  · You injure yourself during a seizure.  These symptoms may represent a serious problem that is an emergency. Do not wait to see if the symptoms will go away. Get medical help right away. Call your  local emergency services (911 in the [place] Do not drive yourself to the hospital.  Summary  · Seizures are caused by abnormal electrical activity in the brain. The activity disrupts normal brain function and can cause various symptoms, such as convulsions, abnormal movements, or a change in consciousness.  · There are many causes of seizures, including illnesses, medicines, genetic conditions, head injuries, strokes, tumors, substance abuse, or substance withdrawal.  · Most seizures will stop on their own in under 5 minutes. Seizures that last longer than 5 minutes are a medical emergency and require immediate treatment.  · Many medicines are used to treat seizures. Take over-the-counter and prescription medicines only as told by your health care provider.  This information is not intended to replace advice given to you by your health care provider. Make sure you discuss any questions you have with your health care provider.  Document Revised: 11/13/2020 Document Reviewed: 11/13/2020  [employer] Patient Education © 2021 [employer]
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
## 2021-10-12 NOTE — TELEPHONE ENCOUNTER
It is under media and was sent in 9- and it does not say anything about diabetes I called pt no answer left message to return call

## 2021-10-13 NOTE — TELEPHONE ENCOUNTER
Pt inf and also I inf her to reach out to her Short term disability insurance and find out what information is needed and bring in paperwork if any is needed

## 2021-10-15 PROBLEM — R56.9 SEIZURE-LIKE ACTIVITY (HCC): Status: ACTIVE | Noted: 2021-10-15

## 2021-10-15 NOTE — ASSESSMENT & PLAN NOTE
Discussed that seizure-like episode could been secondary or influenced by multiple factors including rhabdomyolysis, methamphetamine use, abrupt discontinuation of an SSRI.  Will order labs, MRI brain, EEG for further evaluation.  She is not currently cleared to drive.  Discussed routine seizure precautions including, but not limited to, avoiding bathing alone, swimming alone, climbing on ladders.  Also discussed need for medication compliance and risks of unmanaged epilepsy including status epilepticus, permanent brain injury or potentially death.  Patient is aware of [place] state law regarding no driving for 90 days following a seizure.

## 2021-10-29 ENCOUNTER — APPOINTMENT (OUTPATIENT)
Dept: MRI IMAGING | Facility: HOSPITAL | Age: 31
End: 2021-10-29

## 2021-11-02 ENCOUNTER — HOSPITAL ENCOUNTER (OUTPATIENT)
Dept: MRI IMAGING | Facility: HOSPITAL | Age: 31
Discharge: HOME | End: 2021-11-02

## 2021-11-02 DIAGNOSIS — R56.9 SEIZURE-LIKE ACTIVITY (HCC): ICD-10-CM

## 2021-11-02 DIAGNOSIS — M77.8 CAPSULITIS OF RIGHT SHOULDER: ICD-10-CM

## 2021-11-02 DIAGNOSIS — M62.82 NON-TRAUMATIC RHABDOMYOLYSIS: ICD-10-CM

## 2021-11-02 DIAGNOSIS — M79.621 PAIN OF RIGHT UPPER ARM: ICD-10-CM

## 2021-11-02 DIAGNOSIS — M25.411 PAIN AND SWELLING OF RIGHT SHOULDER: ICD-10-CM

## 2021-11-02 DIAGNOSIS — M25.411 PAIN AND SWELLING OF RIGHT SHOULDER: Primary | ICD-10-CM

## 2021-11-02 DIAGNOSIS — M25.511 PAIN AND SWELLING OF RIGHT SHOULDER: ICD-10-CM

## 2021-11-02 DIAGNOSIS — M25.511 PAIN AND SWELLING OF RIGHT SHOULDER: Primary | ICD-10-CM

## 2021-11-02 PROCEDURE — 70553 MRI BRAIN STEM W/O & W/DYE: CPT

## 2021-11-02 PROCEDURE — 73221 MRI JOINT UPR EXTREM W/O DYE: CPT

## 2021-11-02 PROCEDURE — 0 GADOBENATE DIMEGLUMINE 529 MG/ML SOLUTION: Performed by: NURSE PRACTITIONER

## 2021-11-02 PROCEDURE — A9577 INJ MULTIHANCE: HCPCS | Performed by: NURSE PRACTITIONER

## 2021-11-02 RX ADMIN — GADOBENATE DIMEGLUMINE 15 ML: 529 INJECTION, SOLUTION INTRAVENOUS at 15:10

## 2021-11-08 ENCOUNTER — TELEPHONE (OUTPATIENT)
Dept: FAMILY MEDICINE CLINIC | Age: 31
End: 2021-11-08

## 2021-11-08 NOTE — TELEPHONE ENCOUNTER
Caller: Martha Kothari    Relationship: Self    Best call back number: [phone]     What form or medical record are you requesting: SHORT TERM DISABILITY    Who is requesting this form or medical record from you: [employer]    How would you like to receive the form or medical records (pick-up, mail, fax): FAX  If fax, what is the fax number: [phone]    Timeframe paperwork needed: ASAP    Additional notes: CASE NUMBER IS [id number].    THE [place] IS STATING SOME OF THE PAPERWORK IS MISSING

## 2021-11-30 ENCOUNTER — HOSPITAL ENCOUNTER (OUTPATIENT)
Dept: NEUROLOGY | Facility: HOSPITAL | Age: 31
Discharge: HOME | End: 2021-11-30
Admitting: NURSE PRACTITIONER

## 2021-11-30 DIAGNOSIS — M62.82 NON-TRAUMATIC RHABDOMYOLYSIS: ICD-10-CM

## 2021-11-30 DIAGNOSIS — R56.9 SEIZURE-LIKE ACTIVITY (HCC): ICD-10-CM

## 2021-11-30 PROCEDURE — 95816 EEG AWAKE AND DROWSY: CPT | Performed by: PSYCHIATRY & NEUROLOGY

## 2021-11-30 PROCEDURE — 95816 EEG AWAKE AND DROWSY: CPT

## 2021-12-17 ENCOUNTER — TELEPHONE (OUTPATIENT)
Dept: NEUROSURGERY | Facility: CLINIC | Age: 31
End: 2021-12-17

## 2021-12-20 NOTE — TELEPHONE ENCOUNTER
Attempted to call the pt but was unable to reach her. I did leave a vm for her to call back and discuss.

## 2021-12-20 NOTE — TELEPHONE ENCOUNTER
Can you call and see what this is about? She no-showed her last appt.  I am in clinic and can't call at the moment.

## 2021-12-20 NOTE — TELEPHONE ENCOUNTER
Spoke with Dr. Hanley - this is in regards to a disability claim - I instructed her that I had not seen patient since August, Neuro work up appears to still be in progress and I am not able to give long-term prognosis at this point.

## 2021-12-20 NOTE — TELEPHONE ENCOUNTER
Patient no showed appt in December.  I am not filling out disability paperwork for her.  Workup has been negative.

## 2022-12-27 ENCOUNTER — TELEPHONE (OUTPATIENT)
Dept: NEUROLOGY | Facility: CLINIC | Age: 32
End: 2022-12-27

## 2022-12-27 NOTE — TELEPHONE ENCOUNTER
Caller: JUNE Ferrell call back number:      What was the call regarding: PT NEEDS LETTER FOR [employer]. IF YOU CAN GET HER IN SOONER SHE WOULD APPRECIATE. OR IF CAN SEND HER THE LETTER TO TURN IN . HAS 30 DAYS (ABOUT 15 DAYS LEFT ) OR GET HER IN TO SEE YOU     Do you require a callback:YES       PLEASE ADVISE.

## 2022-12-28 NOTE — TELEPHONE ENCOUNTER
Has to be filled out within 90 days of an office visit.  She hasn't been seen in over a year.  We can't fill out paperwork.  I don't have sooner appt at the moment.  She no showed last appt in Dec 2021. She will need to discuss with PCP.